# Patient Record
Sex: MALE | Race: BLACK OR AFRICAN AMERICAN | NOT HISPANIC OR LATINO | URBAN - METROPOLITAN AREA
[De-identification: names, ages, dates, MRNs, and addresses within clinical notes are randomized per-mention and may not be internally consistent; named-entity substitution may affect disease eponyms.]

---

## 2019-09-30 PROBLEM — Z00.00 ENCOUNTER FOR PREVENTIVE HEALTH EXAMINATION: Status: ACTIVE | Noted: 2019-09-30

## 2020-09-07 ENCOUNTER — EMERGENCY (EMERGENCY)
Facility: HOSPITAL | Age: 30
LOS: 0 days | Discharge: HOME | End: 2020-09-07
Attending: EMERGENCY MEDICINE
Payer: MEDICAID

## 2020-09-07 VITALS
RESPIRATION RATE: 19 BRPM | WEIGHT: 175.05 LBS | OXYGEN SATURATION: 100 % | HEIGHT: 74 IN | TEMPERATURE: 98 F | DIASTOLIC BLOOD PRESSURE: 88 MMHG | HEART RATE: 117 BPM | SYSTOLIC BLOOD PRESSURE: 142 MMHG

## 2020-09-07 VITALS
OXYGEN SATURATION: 98 % | TEMPERATURE: 98 F | DIASTOLIC BLOOD PRESSURE: 76 MMHG | HEART RATE: 68 BPM | SYSTOLIC BLOOD PRESSURE: 140 MMHG | RESPIRATION RATE: 18 BRPM

## 2020-09-07 DIAGNOSIS — R10.32 LEFT LOWER QUADRANT PAIN: ICD-10-CM

## 2020-09-07 LAB
ALBUMIN SERPL ELPH-MCNC: 4 G/DL — SIGNIFICANT CHANGE UP (ref 3.3–5)
ALP SERPL-CCNC: 85 U/L — SIGNIFICANT CHANGE UP (ref 40–120)
ALT FLD-CCNC: 26 U/L — SIGNIFICANT CHANGE UP (ref 12–78)
ANION GAP SERPL CALC-SCNC: 6 MMOL/L — SIGNIFICANT CHANGE UP (ref 5–17)
APPEARANCE UR: CLEAR — SIGNIFICANT CHANGE UP
AST SERPL-CCNC: 19 U/L — SIGNIFICANT CHANGE UP (ref 15–37)
BASOPHILS # BLD AUTO: 0.02 K/UL — SIGNIFICANT CHANGE UP (ref 0–0.2)
BASOPHILS NFR BLD AUTO: 0.2 % — SIGNIFICANT CHANGE UP (ref 0–2)
BILIRUB SERPL-MCNC: 0.6 MG/DL — SIGNIFICANT CHANGE UP (ref 0.2–1.2)
BILIRUB UR-MCNC: NEGATIVE — SIGNIFICANT CHANGE UP
BUN SERPL-MCNC: 18 MG/DL — SIGNIFICANT CHANGE UP (ref 7–23)
CALCIUM SERPL-MCNC: 9.1 MG/DL — SIGNIFICANT CHANGE UP (ref 8.5–10.1)
CHLORIDE SERPL-SCNC: 105 MMOL/L — SIGNIFICANT CHANGE UP (ref 96–108)
CO2 SERPL-SCNC: 28 MMOL/L — SIGNIFICANT CHANGE UP (ref 22–31)
COLOR SPEC: YELLOW — SIGNIFICANT CHANGE UP
CREAT SERPL-MCNC: 1.3 MG/DL — SIGNIFICANT CHANGE UP (ref 0.5–1.3)
DIFF PNL FLD: ABNORMAL
EOSINOPHIL # BLD AUTO: 0.01 K/UL — SIGNIFICANT CHANGE UP (ref 0–0.5)
EOSINOPHIL NFR BLD AUTO: 0.1 % — SIGNIFICANT CHANGE UP (ref 0–6)
EPI CELLS # UR: SIGNIFICANT CHANGE UP
GLUCOSE SERPL-MCNC: 94 MG/DL — SIGNIFICANT CHANGE UP (ref 70–99)
GLUCOSE UR QL: NEGATIVE MG/DL — SIGNIFICANT CHANGE UP
HCT VFR BLD CALC: 41 % — SIGNIFICANT CHANGE UP (ref 39–50)
HGB BLD-MCNC: 13.6 G/DL — SIGNIFICANT CHANGE UP (ref 13–17)
IMM GRANULOCYTES NFR BLD AUTO: 0.3 % — SIGNIFICANT CHANGE UP (ref 0–1.5)
KETONES UR-MCNC: ABNORMAL
LEUKOCYTE ESTERASE UR-ACNC: ABNORMAL
LIDOCAIN IGE QN: 34 U/L — LOW (ref 73–393)
LYMPHOCYTES # BLD AUTO: 0.95 K/UL — LOW (ref 1–3.3)
LYMPHOCYTES # BLD AUTO: 10.3 % — LOW (ref 13–44)
MCHC RBC-ENTMCNC: 28.5 PG — SIGNIFICANT CHANGE UP (ref 27–34)
MCHC RBC-ENTMCNC: 33.2 GM/DL — SIGNIFICANT CHANGE UP (ref 32–36)
MCV RBC AUTO: 85.8 FL — SIGNIFICANT CHANGE UP (ref 80–100)
MONOCYTES # BLD AUTO: 0.67 K/UL — SIGNIFICANT CHANGE UP (ref 0–0.9)
MONOCYTES NFR BLD AUTO: 7.2 % — SIGNIFICANT CHANGE UP (ref 2–14)
NEUTROPHILS # BLD AUTO: 7.57 K/UL — HIGH (ref 1.8–7.4)
NEUTROPHILS NFR BLD AUTO: 81.9 % — HIGH (ref 43–77)
NITRITE UR-MCNC: NEGATIVE — SIGNIFICANT CHANGE UP
NRBC # BLD: 0 /100 WBCS — SIGNIFICANT CHANGE UP (ref 0–0)
PH UR: 6.5 — SIGNIFICANT CHANGE UP (ref 5–8)
PLATELET # BLD AUTO: 225 K/UL — SIGNIFICANT CHANGE UP (ref 150–400)
POTASSIUM SERPL-MCNC: 3.8 MMOL/L — SIGNIFICANT CHANGE UP (ref 3.5–5.3)
POTASSIUM SERPL-SCNC: 3.8 MMOL/L — SIGNIFICANT CHANGE UP (ref 3.5–5.3)
PROT SERPL-MCNC: 8.3 GM/DL — SIGNIFICANT CHANGE UP (ref 6–8.3)
PROT UR-MCNC: 15 MG/DL
RBC # BLD: 4.78 M/UL — SIGNIFICANT CHANGE UP (ref 4.2–5.8)
RBC # FLD: 13.2 % — SIGNIFICANT CHANGE UP (ref 10.3–14.5)
SODIUM SERPL-SCNC: 139 MMOL/L — SIGNIFICANT CHANGE UP (ref 135–145)
SP GR SPEC: 1.02 — SIGNIFICANT CHANGE UP (ref 1.01–1.02)
UROBILINOGEN FLD QL: 1 MG/DL
WBC # BLD: 9.25 K/UL — SIGNIFICANT CHANGE UP (ref 3.8–10.5)
WBC # FLD AUTO: 9.25 K/UL — SIGNIFICANT CHANGE UP (ref 3.8–10.5)
WBC UR QL: SIGNIFICANT CHANGE UP

## 2020-09-07 PROCEDURE — 99285 EMERGENCY DEPT VISIT HI MDM: CPT

## 2020-09-07 PROCEDURE — 74177 CT ABD & PELVIS W/CONTRAST: CPT | Mod: 26

## 2020-09-07 PROCEDURE — 93010 ELECTROCARDIOGRAM REPORT: CPT

## 2020-09-07 RX ORDER — SIMETHICONE 80 MG/1
1 TABLET, CHEWABLE ORAL
Qty: 15 | Refills: 0
Start: 2020-09-07 | End: 2020-09-11

## 2020-09-07 RX ORDER — SODIUM CHLORIDE 9 MG/ML
1000 INJECTION INTRAMUSCULAR; INTRAVENOUS; SUBCUTANEOUS ONCE
Refills: 0 | Status: COMPLETED | OUTPATIENT
Start: 2020-09-07 | End: 2020-09-07

## 2020-09-07 RX ADMIN — SODIUM CHLORIDE 1000 MILLILITER(S): 9 INJECTION INTRAMUSCULAR; INTRAVENOUS; SUBCUTANEOUS at 10:10

## 2020-09-07 RX ADMIN — Medication 30 MILLILITER(S): at 10:10

## 2020-09-07 NOTE — ED PROVIDER NOTE - PATIENT PORTAL LINK FT
You can access the FollowMyHealth Patient Portal offered by [facility] by registering at the following website: [email or web address]. By joining [employer]’s FollowMyHealth portal, you will also be able to view your health information using other applications (apps) compatible with our system.

## 2020-09-07 NOTE — ED ADULT NURSE NOTE - NSIMPLEMENTINTERV_GEN_ALL_ED
Implemented All Universal Safety Interventions:  [place] to call system. Call bell, personal items and telephone within reach. Instruct patient to call for assistance. Room bathroom lighting operational. Non-slip footwear when patient is off stretcher. Physically safe environment: no spills, clutter or unnecessary equipment. Stretcher in lowest position, wheels locked, appropriate side rails in place.

## 2020-09-07 NOTE — ED PROVIDER NOTE - OBJECTIVE STATEMENT
Pt is a 28 yo gentleman with no significant past medical history who presents to the ED with LLQ abd pain for 1 week. Has had increased BM, no diarrhea, no black stools, no bloody stools. No n/v, no chest pain, no sob. No fevers. No hx of this in the past.

## 2020-09-07 NOTE — ED PROVIDER NOTE - PROGRESS NOTE DETAILS
Pt is feeling better, pain resolved. CT negative. Has lung nodule, results given and explained to pt and referred to pmd for f/u.

## 2020-09-07 NOTE — ED ADULT TRIAGE NOTE - CHIEF COMPLAINT QUOTE
patient c/o of abdominal pain for 1 week , denied N/V , denied dysuria no blood in urine , patient stated "I was treated for UTI few months ago " , c/o of " black stool ", patient stated " I feel my heart raising sometimes " denied anxiety, denied chest pain c/o of epigastric pain " sometimes"

## 2020-09-07 NOTE — ED PROVIDER NOTE - CLINICAL SUMMARY MEDICAL DECISION MAKING FREE TEXT BOX
Ddx: no abdominal tenderness or guarding to suggest surgical abdomen. Ro diverticulitis  Plan: Cbc, cmp, lipase, GI cocktail, CT abd, reassess

## 2020-09-08 LAB
CULTURE RESULTS: SIGNIFICANT CHANGE UP
SPECIMEN SOURCE: SIGNIFICANT CHANGE UP

## 2021-02-22 NOTE — ED ADULT NURSE NOTE - OBJECTIVE STATEMENT
No 28 y/o male with no PMH. Presents to the ED with C/C of lower left abdominal pain that radiates to the periumbilical area & left flank area ( red bruise in that area) for the past 7 days. Pain is cramp like, and intermittent. Pain is relieved after defecating and worst when laying down. Pt used Pepto-Bismol for stomach upset and had mild relief. pt denies any chest pain, or urinary symptoms.

## 2021-03-08 ENCOUNTER — EMERGENCY (EMERGENCY)
Facility: HOSPITAL | Age: 31
LOS: 0 days | Discharge: HOME | End: 2021-03-08
Attending: STUDENT IN AN ORGANIZED HEALTH CARE EDUCATION/TRAINING PROGRAM
Payer: COMMERCIAL

## 2021-03-08 VITALS
SYSTOLIC BLOOD PRESSURE: 128 MMHG | TEMPERATURE: 97 F | HEART RATE: 68 BPM | RESPIRATION RATE: 16 BRPM | DIASTOLIC BLOOD PRESSURE: 72 MMHG | OXYGEN SATURATION: 99 %

## 2021-03-08 VITALS
OXYGEN SATURATION: 100 % | WEIGHT: 177.03 LBS | DIASTOLIC BLOOD PRESSURE: 80 MMHG | TEMPERATURE: 98 F | HEIGHT: 74 IN | HEART RATE: 90 BPM | SYSTOLIC BLOOD PRESSURE: 144 MMHG | RESPIRATION RATE: 16 BRPM

## 2021-03-08 DIAGNOSIS — M25.512 PAIN IN LEFT SHOULDER: ICD-10-CM

## 2021-03-08 DIAGNOSIS — R07.9 CHEST PAIN, UNSPECIFIED: ICD-10-CM

## 2021-03-08 DIAGNOSIS — R00.0 TACHYCARDIA, UNSPECIFIED: ICD-10-CM

## 2021-03-08 LAB
ALBUMIN SERPL ELPH-MCNC: 4.4 G/DL — SIGNIFICANT CHANGE UP (ref 3.3–5)
ALP SERPL-CCNC: 89 U/L — SIGNIFICANT CHANGE UP (ref 40–120)
ALT FLD-CCNC: 24 U/L — SIGNIFICANT CHANGE UP (ref 12–78)
ANION GAP SERPL CALC-SCNC: 5 MMOL/L — SIGNIFICANT CHANGE UP (ref 5–17)
AST SERPL-CCNC: 23 U/L — SIGNIFICANT CHANGE UP (ref 15–37)
BASOPHILS # BLD AUTO: 0.05 K/UL — SIGNIFICANT CHANGE UP (ref 0–0.2)
BASOPHILS NFR BLD AUTO: 0.7 % — SIGNIFICANT CHANGE UP (ref 0–2)
BILIRUB SERPL-MCNC: 0.3 MG/DL — SIGNIFICANT CHANGE UP (ref 0.2–1.2)
BUN SERPL-MCNC: 15 MG/DL — SIGNIFICANT CHANGE UP (ref 7–23)
CALCIUM SERPL-MCNC: 8.7 MG/DL — SIGNIFICANT CHANGE UP (ref 8.5–10.1)
CHLORIDE SERPL-SCNC: 112 MMOL/L — HIGH (ref 96–108)
CO2 SERPL-SCNC: 27 MMOL/L — SIGNIFICANT CHANGE UP (ref 22–31)
CREAT SERPL-MCNC: 1.32 MG/DL — HIGH (ref 0.5–1.3)
EOSINOPHIL # BLD AUTO: 0.09 K/UL — SIGNIFICANT CHANGE UP (ref 0–0.5)
EOSINOPHIL NFR BLD AUTO: 1.2 % — SIGNIFICANT CHANGE UP (ref 0–6)
GLUCOSE SERPL-MCNC: 78 MG/DL — SIGNIFICANT CHANGE UP (ref 70–99)
HCT VFR BLD CALC: 42.4 % — SIGNIFICANT CHANGE UP (ref 39–50)
HGB BLD-MCNC: 13.7 G/DL — SIGNIFICANT CHANGE UP (ref 13–17)
IMM GRANULOCYTES NFR BLD AUTO: 0.3 % — SIGNIFICANT CHANGE UP (ref 0–1.5)
LYMPHOCYTES # BLD AUTO: 2.01 K/UL — SIGNIFICANT CHANGE UP (ref 1–3.3)
LYMPHOCYTES # BLD AUTO: 27.1 % — SIGNIFICANT CHANGE UP (ref 13–44)
MCHC RBC-ENTMCNC: 27.7 PG — SIGNIFICANT CHANGE UP (ref 27–34)
MCHC RBC-ENTMCNC: 32.3 GM/DL — SIGNIFICANT CHANGE UP (ref 32–36)
MCV RBC AUTO: 85.8 FL — SIGNIFICANT CHANGE UP (ref 80–100)
MONOCYTES # BLD AUTO: 0.64 K/UL — SIGNIFICANT CHANGE UP (ref 0–0.9)
MONOCYTES NFR BLD AUTO: 8.6 % — SIGNIFICANT CHANGE UP (ref 2–14)
NEUTROPHILS # BLD AUTO: 4.62 K/UL — SIGNIFICANT CHANGE UP (ref 1.8–7.4)
NEUTROPHILS NFR BLD AUTO: 62.1 % — SIGNIFICANT CHANGE UP (ref 43–77)
NRBC # BLD: 0 /100 WBCS — SIGNIFICANT CHANGE UP (ref 0–0)
PLATELET # BLD AUTO: 253 K/UL — SIGNIFICANT CHANGE UP (ref 150–400)
POTASSIUM SERPL-MCNC: 4 MMOL/L — SIGNIFICANT CHANGE UP (ref 3.5–5.3)
POTASSIUM SERPL-SCNC: 4 MMOL/L — SIGNIFICANT CHANGE UP (ref 3.5–5.3)
PROT SERPL-MCNC: 8 GM/DL — SIGNIFICANT CHANGE UP (ref 6–8.3)
RBC # BLD: 4.94 M/UL — SIGNIFICANT CHANGE UP (ref 4.2–5.8)
RBC # FLD: 13.4 % — SIGNIFICANT CHANGE UP (ref 10.3–14.5)
SODIUM SERPL-SCNC: 144 MMOL/L — SIGNIFICANT CHANGE UP (ref 135–145)
TROPONIN I SERPL-MCNC: <.015 NG/ML — SIGNIFICANT CHANGE UP (ref 0.01–0.04)
WBC # BLD: 7.43 K/UL — SIGNIFICANT CHANGE UP (ref 3.8–10.5)
WBC # FLD AUTO: 7.43 K/UL — SIGNIFICANT CHANGE UP (ref 3.8–10.5)

## 2021-03-08 PROCEDURE — 73030 X-RAY EXAM OF SHOULDER: CPT | Mod: 26,LT

## 2021-03-08 PROCEDURE — 99285 EMERGENCY DEPT VISIT HI MDM: CPT

## 2021-03-08 PROCEDURE — 71045 X-RAY EXAM CHEST 1 VIEW: CPT | Mod: 26

## 2021-03-08 PROCEDURE — 93010 ELECTROCARDIOGRAM REPORT: CPT

## 2021-03-08 RX ORDER — KETOROLAC TROMETHAMINE 30 MG/ML
15 SYRINGE (ML) INJECTION ONCE
Refills: 0 | Status: DISCONTINUED | OUTPATIENT
Start: 2021-03-08 | End: 2021-03-08

## 2021-03-08 RX ORDER — METHOCARBAMOL 500 MG/1
1 TABLET, FILM COATED ORAL
Qty: 14 | Refills: 0
Start: 2021-03-08 | End: 2021-03-14

## 2021-03-08 RX ORDER — KETOROLAC TROMETHAMINE 30 MG/ML
30 SYRINGE (ML) INJECTION ONCE
Refills: 0 | Status: DISCONTINUED | OUTPATIENT
Start: 2021-03-08 | End: 2021-03-08

## 2021-03-08 RX ORDER — IBUPROFEN 200 MG
1 TABLET ORAL
Qty: 30 | Refills: 0
Start: 2021-03-08 | End: 2021-03-12

## 2021-03-08 RX ORDER — METHOCARBAMOL 500 MG/1
750 TABLET, FILM COATED ORAL ONCE
Refills: 0 | Status: COMPLETED | OUTPATIENT
Start: 2021-03-08 | End: 2021-03-08

## 2021-03-08 RX ORDER — LIDOCAINE 4 G/100G
1 CREAM TOPICAL ONCE
Refills: 0 | Status: COMPLETED | OUTPATIENT
Start: 2021-03-08 | End: 2021-03-08

## 2021-03-08 RX ORDER — LIDOCAINE 4 G/100G
1 CREAM TOPICAL
Qty: 7 | Refills: 0
Start: 2021-03-08 | End: 2021-03-14

## 2021-03-08 RX ADMIN — LIDOCAINE 1 PATCH: 4 CREAM TOPICAL at 14:38

## 2021-03-08 RX ADMIN — Medication 15 MILLIGRAM(S): at 14:38

## 2021-03-08 RX ADMIN — METHOCARBAMOL 750 MILLIGRAM(S): 500 TABLET, FILM COATED ORAL at 14:38

## 2021-03-08 NOTE — ED PROVIDER NOTE - CARE PROVIDER_API CALL
Andrews Gautam (DO)  Orthopaedic Surgery  [address] [place] [address]  Phone: [phone]  Fax: [phone]  Follow Up Time: 7-10 Days

## 2021-03-08 NOTE — ED ADULT NURSE NOTE - NS_SISCREENINGSR_GEN_ALL_ED
Negative
Patient seen and examined.  Agree with above.   -Admitted with dyspnea, found to have left pleural effusion  -TTE with no pericardial effusion  -Check non-con ct chest  -IV diuresis  -Pulm eval with Dr. Nilo Mo MD
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
## 2021-03-08 NOTE — ED PROVIDER NOTE - PHYSICAL EXAMINATION
GEN: Awake, alert, interactive, NAD.  HEAD AND NECK: NC/AT. Airway patent. Neck supple.   EYES:  Clear b/l. EOMI. PERRL.   ENT: Moist mucus membranes.   CARDIAC: Regular rate, regular rhythm. No evident pedal edema.    RESP/CHEST: Normal respiratory effort with no use of accessory muscles or retractions. Clear throughout on auscultation.  ABD: soft, non-distended, non-tender. No rebound, no guarding.   BACK: No midline spinal TTP. No CVAT.   EXTREMITIES: Moving all extremities with no apparent deformities.   SKIN: Warm, dry, intact normal color. No rash.   NEURO: AOx3, CN II-XII grossly intact, no focal deficits.   PSYCH: Appropriate mood and affect. GEN: Awake, alert, interactive, NAD.  HEAD AND NECK: NC/AT. Airway patent. Neck supple.   EYES:  Clear b/l. EOMI. PERRL.   ENT: Moist mucus membranes.   CARDIAC: Regular rate, regular rhythm. No evident pedal edema.    RESP/CHEST: Normal respiratory effort with no use of accessory muscles or retractions. Clear throughout on auscultation.  ABD: Soft, non-distended, non-tender. No rebound, no guarding.   BACK: No midline spinal TTP. No CVAT.   EXTREMITIES: Moving all extremities with no apparent deformities. FROM L shoulder, pain w/ movement. + TTP lateral L shoulder.   SKIN: Warm, dry, intact normal color. No rash.   NEURO: AOx3, CN II-XII grossly intact, no focal deficits.   PSYCH: Appropriate mood and affect.

## 2021-03-08 NOTE — ED ADULT NURSE NOTE - OBJECTIVE STATEMENT
pt has had cp on and off for 3 years went to  today and was told to go to hospital Pt also has left shoulder pain. Pt denies known injury Has full ROM + distal pulses, and + sensation.

## 2021-03-08 NOTE — ED PROVIDER NOTE - CLINICAL SUMMARY MEDICAL DECISION MAKING FREE TEXT BOX
Otherwise healthy 31yo M pw L shoulder pain upon awakening this AM, pt also reports 3yrs of intermittent CP. AFVSS. Pt well appearing, in NAD, exam as noted in PE. Plan: Obtain ECG, CXR, XR L shoulder, CBC, CMP, trop. Give Robaxin, Toradol, Lidoderm. Re-eval. ECG NSR, trop neg, CXR and XR L shoulder w/o acute pathology. CBC, CMP w/o significant abnormalities. On re-eval, resting comfortably. Stable for d/c home. Given scripts for Robaxin, Lidoderm, Motrin. Recommend f/u w/ Ortho, PCP. Return signs and symptoms d/w pt. He understands and agrees w/ this plan.

## 2021-03-08 NOTE — ED PROVIDER NOTE - PATIENT PORTAL LINK FT
You can access the FollowMyHealth Patient Portal offered by [facility] by registering at the following website: [email or web address]. By joining [employer]’s FollowMyHealth portal, you will also be able to view your health information using other applications (apps) compatible with our system.

## 2021-06-08 NOTE — ED PROVIDER NOTE - CARDIAC, MLM
Received a fax from pharmacy that bengay is not covered  I called and informed patient that this is over the counter and patient could purchase OTC and she could also get generic brand bengay  Patient understood and had no further questions 
Normal rate, regular rhythm.  Heart sounds S1, S2.  No murmurs, rubs or gallops.

## 2022-09-04 NOTE — ED ADULT NURSE NOTE - NS ED NOTE ABUSE SUSPICION NEGLECT YN
Use your Nebulizer every 4 hours at home.   Monitor your oxygen at home with the pulse ox. If you have readings in low 90's or below 90 you need to return to ED.   Finish the Levaquin you are taking.   
No

## 2022-11-30 NOTE — ED ADULT NURSE NOTE - PLAN OF CARE
Spoke to patient and confirmed procedure with Dr Suh on 12/14/22. Pt says he has his instructions.    He just has questions about his Eliquis.    Please call to discuss  
Spoke with patient   Aware of instructions and questions answered.   
Call bell/Explanation of exam/test

## 2023-04-15 ENCOUNTER — EMERGENCY (EMERGENCY)
Facility: HOSPITAL | Age: 33
LOS: 0 days | Discharge: HOME | End: 2023-04-15
Payer: MEDICAID

## 2023-04-15 VITALS
HEART RATE: 73 BPM | SYSTOLIC BLOOD PRESSURE: 126 MMHG | TEMPERATURE: 98 F | DIASTOLIC BLOOD PRESSURE: 80 MMHG | RESPIRATION RATE: 18 BRPM | OXYGEN SATURATION: 99 %

## 2023-04-15 VITALS
DIASTOLIC BLOOD PRESSURE: 86 MMHG | HEIGHT: 74 IN | OXYGEN SATURATION: 99 % | RESPIRATION RATE: 18 BRPM | TEMPERATURE: 98 F | SYSTOLIC BLOOD PRESSURE: 131 MMHG | WEIGHT: 184.97 LBS | HEART RATE: 108 BPM

## 2023-04-15 DIAGNOSIS — K02.9 DENTAL CARIES, UNSPECIFIED: ICD-10-CM

## 2023-04-15 DIAGNOSIS — K08.89 OTHER SPECIFIED DISORDERS OF TEETH AND SUPPORTING STRUCTURES: ICD-10-CM

## 2023-04-15 PROBLEM — Z78.9 OTHER SPECIFIED HEALTH STATUS: Chronic | Status: ACTIVE | Noted: 2021-03-08

## 2023-04-15 PROCEDURE — 99284 EMERGENCY DEPT VISIT MOD MDM: CPT

## 2023-04-15 RX ORDER — IBUPROFEN 200 MG
1 TABLET ORAL
Qty: 16 | Refills: 0
Start: 2023-04-15 | End: 2023-04-18

## 2023-04-15 RX ORDER — IBUPROFEN 200 MG
600 TABLET ORAL ONCE
Refills: 0 | Status: COMPLETED | OUTPATIENT
Start: 2023-04-15 | End: 2023-04-15

## 2023-04-15 RX ADMIN — Medication 600 MILLIGRAM(S): at 12:36

## 2023-04-15 RX ADMIN — Medication 1 TABLET(S): at 12:36

## 2023-04-15 NOTE — ED PROVIDER NOTE - NSFOLLOWUPINSTRUCTIONS_ED_ALL_ED_FT
Rest, drink plenty of fluids.  Advance activity as tolerated.  Continue all previously prescribed medications as directed.  Follow up with dental this week- bring copies of your results.  Return to the ER for worsening or persistent symptoms, and/or ANY NEW OR CONCERNING SYMPTOMS. If you have issues obtaining follow up, please call: [phone] to obtain a doctor or specialist who takes your insurance in your area.  You may call [phone] to make an appointment with the internal medicine clinic.

## 2023-04-15 NOTE — ED ADULT NURSE NOTE - OBJECTIVE STATEMENT
Pt is A&OX4, ambulatory. Complaining of left sided tooth pain for about 2 months. Went to urgent care where they prescribed antibiotics and motrin. Today pain increased. No swelling noted. Note in acute distress. Pt is A&OX4, ambulatory. Complaining of left sided tooth pain for about 2 months. Went to urgent care where they prescribed antibiotics and motrin. Pt took the antibiotics for 4 days then stopped. Today pain increased. No swelling noted. Note in acute distress.

## 2023-04-15 NOTE — ED PROVIDER NOTE - PATIENT PORTAL LINK FT
You can access the FollowMyHealth Patient Portal offered by [facility] by registering at the following website: [email or web address]. By joining [employer]’s FollowMyHealth portal, you will also be able to view your health information using other applications (apps) compatible with our system.

## 2023-04-15 NOTE — ED PROVIDER NOTE - PHYSICAL EXAMINATION
GEN: Awake, alert, interactive, NAD. Speaking in full sentences.   HEAD AND NECK: NC/AT. Airway patent. Neck supple. NO neck swelling.   EYES:  Clear b/l. EOMI. PERRL.   ENT: Moist mucus membranes. Mouth: (+) decay left lower molar tooth, NO gum swelling,  (+) uvula midline, airway patent. No stridor. NO hoarseness.   CARDIAC: Regular rate, regular rhythm. No evident pedal edema.    RESP/CHEST: Normal respiratory effort with no use of accessory muscles or retractions. Clear throughout on auscultation.  EXTREMITIES: Moving all extremities with no apparent deformities.   SKIN: Warm, dry, intact normal color. No rash.   NEURO: AOx3, no focal deficits.   PSYCH: Appropriate mood and affect.

## 2023-04-15 NOTE — ED PROVIDER NOTE - OBJECTIVE STATEMENT
31 y/o male with no PMH presents with left lower molar tooth pain x few years. Pt reports tooth has been decay for year and now causing some pain. Denies fever, chills. Patient reports pain with eating and chewing. Patient saw dentist last month and was given pain medications and abx which he didn't complete abx. Denies difficulty breathing, difficulty swallowing, neck swelling.

## 2023-04-15 NOTE — ED PROVIDER NOTE - CONTACT TIME
Lauren,  Your lab results were normal/stable. Please feel free to my chart or call the office with questions. Melina Lomax M.D. 15-Apr-2023 12:37

## 2023-04-15 NOTE — ED PROVIDER NOTE - CLINICAL SUMMARY MEDICAL DECISION MAKING FREE TEXT BOX
31 y/o male with no PMH presents with left lower molar tooth decay x years with worsening pain. Vs stable. NO signs of dental abscess.   Will given motrin, Augmentin, and peridex and referred to dental.     Pt stable to be discharged home.    Rx motrin , peridex, and augmentin sent to pharmacy.

## 2023-04-15 NOTE — ED PROVIDER NOTE - NSFOLLOWUPCLINICS_GEN_ALL_ED_FT
Oral & Maxillofacial Surgery  Department of Dental Medicine  [address] [place] [address]  Phone: [phone]  Fax: [phone]  Follow Up Time: 1-3 Days

## 2023-05-12 ENCOUNTER — EMERGENCY (EMERGENCY)
Facility: HOSPITAL | Age: 33
LOS: 1 days | Discharge: HOME | End: 2023-05-12
Attending: STUDENT IN AN ORGANIZED HEALTH CARE EDUCATION/TRAINING PROGRAM | Admitting: STUDENT IN AN ORGANIZED HEALTH CARE EDUCATION/TRAINING PROGRAM
Payer: MEDICAID

## 2023-05-12 VITALS
HEART RATE: 67 BPM | RESPIRATION RATE: 16 BRPM | TEMPERATURE: 98 F | OXYGEN SATURATION: 100 % | SYSTOLIC BLOOD PRESSURE: 126 MMHG | DIASTOLIC BLOOD PRESSURE: 89 MMHG

## 2023-05-12 VITALS
TEMPERATURE: 99 F | RESPIRATION RATE: 16 BRPM | HEIGHT: 74 IN | SYSTOLIC BLOOD PRESSURE: 145 MMHG | OXYGEN SATURATION: 100 % | DIASTOLIC BLOOD PRESSURE: 88 MMHG | HEART RATE: 71 BPM

## 2023-05-12 PROCEDURE — 99283 EMERGENCY DEPT VISIT LOW MDM: CPT

## 2023-05-12 RX ORDER — OXYCODONE AND ACETAMINOPHEN 5; 325 MG/1; MG/1
1 TABLET ORAL
Qty: 6 | Refills: 0
Start: 2023-05-12 | End: 2023-05-13

## 2023-05-12 RX ORDER — OXYCODONE AND ACETAMINOPHEN 5; 325 MG/1; MG/1
1 TABLET ORAL ONCE
Refills: 0 | Status: DISCONTINUED | OUTPATIENT
Start: 2023-05-12 | End: 2023-05-12

## 2023-05-12 RX ORDER — OXYCODONE HYDROCHLORIDE 5 MG/1
5 TABLET ORAL ONCE
Refills: 0 | Status: DISCONTINUED | OUTPATIENT
Start: 2023-05-12 | End: 2023-05-12

## 2023-05-12 RX ADMIN — OXYCODONE HYDROCHLORIDE 5 MILLIGRAM(S): 5 TABLET ORAL at 08:30

## 2023-05-12 NOTE — ED PROVIDER NOTE - IV ALTEPLASE EXCL ABS HIDDEN
· CT: L1 compression fracture with approximately 30% height loss and retropulsion of approximately 4 5 mm resulting in focal narrowing of the spinal canal at this level  This is age indeterminate and correlation for tenderness in this area recommended  · Discussed with Neurosurgery  Given no pain at this time would not recommend bracing  Discussed with patients  regarding risks of further retropulsion and cord compression  He does not wish for bracing either  Outpatient follow up  No lifting > 10 lbs and no twisting   If develops neurological symptoms or pain, MRI and neurosurg eval    · Lidocaine patch and scheduled Tylenol for pain control show

## 2023-05-12 NOTE — ED PROVIDER NOTE - CLINICAL SUMMARY MEDICAL DECISION MAKING FREE TEXT BOX
32y M w/ no PMHx presenting with worsening tooth pain. Tooth pain and decay located to tooth #19, tooth is cracked, +tenderness to gum line, no purulent drainage noted. Uvula midline, lungs CTAB, no rash, no facial swelling, no submandibular swelling, speaking in clear and complete sentences. Will treat pain, attempt to secure appointment in dental clinic for patient.

## 2023-05-12 NOTE — ED PROVIDER NOTE - PATIENT PORTAL LINK FT
You can access the FollowMyHealth Patient Portal offered by [facility] by registering at the following website: [email or web address]. By joining [employer]’s FollowMyHealth portal, you will also be able to view your health information using other applications (apps) compatible with our system.

## 2023-05-12 NOTE — ED ADULT TRIAGE NOTE - CHIEF COMPLAINT QUOTE
Pt arrives to ED c/o bottom left decaying tooth.  Pt was seen at [facility] and received 2 rounds of Abx with no improvement.  Pt would like tooth to come out.  06:50 600mg Ibuprofen.

## 2023-05-12 NOTE — ED POST DISCHARGE NOTE - RESULT SUMMARY
Received call from pharmacist at this time who states that pharmacy not able to fill Rx for percocet due to issue with ROSELINE# for Dr. Lozano.  Upon review of EMR, there is an Rx for percocet q8 prn.  New Rx for same medication sent to pharmacy at this time.

## 2023-05-12 NOTE — ED PROVIDER NOTE - OBJECTIVE STATEMENT
32y M w/ no PMHx presenting with worsening tooth pain. Patient states he was seen at an urgent care for his tooth pain was treated with rx of ibuprofen, then went to [place] ED last week for continued pain, was started on a course of abx which he states he has finished but unsure of which abx and was referred to [facility] dental clinic. Has not yet been able to secure appointment. Patient states ibuprofen helps take the edge of his pain before it returns. Denies fever, chills, dizziness, sob, cp, cough, nausea, vomiting, rash. Patient also states that he has had chronic issues with R lower molar for years but has never had pain like this. States his pain worsened two days ago prompting him to come to ED for evaluation.

## 2023-05-12 NOTE — ED ADULT NURSE REASSESSMENT NOTE - NS ED NURSE REASSESS COMMENT FT1
Patient to be discharged at this time. Awaiting discharge paperwork by MD. No IV placed. A&Ox4, ambulatory, states pain is mostly relieved post pain medications. RR equal and unlabored. Safety maintained.

## 2023-05-12 NOTE — ED PROVIDER NOTE - NSFOLLOWUPINSTRUCTIONS_ED_ALL_ED_FT
- Lab and imaging results, if performed, were discussed with you along with your discharge diagnosis    - Will be contacted by the dental clinic to help set up your appointment.  If despite taking pain medication at home your pain worsens you develop fever or chills return to the emergency department.    - Return to the ED for any new, worsening, or concerning symptoms to you    - Continue all prescribed medications    - Take ibuprofen as directed as needed for pain, DO NOT TAKE TYLENOL     - Rest and keep yourself hydrated with fluids    -Eat a soft diet

## 2023-05-12 NOTE — ED PROVIDER NOTE - NSFOLLOWUPCLINICS_GEN_ALL_ED_FT
[facility]  Dental  [address] [place] [id number]  Phone: [phone]  Fax:

## 2023-05-12 NOTE — ED PROVIDER NOTE - PROGRESS NOTE DETAILS
-Wilma Lozano D.O: discharge center attempted to contact dental clinic, unable to get through, email sent, will await call back. Patient much more comfortable on reassessment after pain medication administered. -Wilma Lozano D.O: Spoke with discharge center, awaiting callback from dental clinic.  Patient's pain is well controlled.  We will send pain medications to pharmacy given strict return precautions patient understands will be contacted to help set up appointment with dental clinic.

## 2023-05-12 NOTE — ED PROVIDER NOTE - PHYSICAL EXAMINATION
Physical Exam:  Gen: NAD, AOx3, non-toxic appearing, able to ambulate without assistance  Head: NCAT  HEENT: EOMI, normal conjunctiva, tongue midline, oral mucosa moist, +decay to tooth #19, cracked w/ area of tenderness to gum line, also decay to tooth #30 without tenderness   Lung: CTAB, no respiratory distress, no wheezes/rhonchi/rales B/L, speaking in full sentences  CV: RRR, no murmurs, rubs or gallops  MSK: FROM of neck   Neuro: No focal sensory or motor deficits  Skin: Warm, well perfused, no rash  Wilma Lozano D.O.

## 2023-06-23 NOTE — ED ADULT NURSE NOTE - NSSEPSISNEWALTERMENTAL_ED_A_ED
What Type Of Note Output Would You Prefer (Optional)?: Bullet Format Is The Patient Presenting As Previously Scheduled?: Yes How Severe Is Your Rash?: mild Is This A New Presentation, Or A Follow-Up?: Rash Additional History: Patient states rash is secondary to bug bites. No